# Patient Record
Sex: MALE | Race: WHITE | NOT HISPANIC OR LATINO | Employment: FULL TIME | URBAN - METROPOLITAN AREA
[De-identification: names, ages, dates, MRNs, and addresses within clinical notes are randomized per-mention and may not be internally consistent; named-entity substitution may affect disease eponyms.]

---
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
## 2022-11-10 ENCOUNTER — OFFICE VISIT (OUTPATIENT)
Dept: URGENT CARE | Facility: URGENT CARE | Age: 22
End: 2022-11-10
Payer: COMMERCIAL

## 2022-11-10 ENCOUNTER — ANCILLARY PROCEDURE (OUTPATIENT)
Dept: GENERAL RADIOLOGY | Facility: CLINIC | Age: 22
End: 2022-11-10
Attending: FAMILY MEDICINE
Payer: COMMERCIAL

## 2022-11-10 VITALS
DIASTOLIC BLOOD PRESSURE: 84 MMHG | TEMPERATURE: 98.2 F | RESPIRATION RATE: 18 BRPM | BODY MASS INDEX: 25.77 KG/M2 | WEIGHT: 198 LBS | OXYGEN SATURATION: 99 % | SYSTOLIC BLOOD PRESSURE: 124 MMHG | HEART RATE: 66 BPM

## 2022-11-10 DIAGNOSIS — M79.672 LEFT FOOT PAIN: Primary | ICD-10-CM

## 2022-11-10 PROCEDURE — 73630 X-RAY EXAM OF FOOT: CPT | Mod: TC | Performed by: STUDENT IN AN ORGANIZED HEALTH CARE EDUCATION/TRAINING PROGRAM

## 2022-11-10 PROCEDURE — 99203 OFFICE O/P NEW LOW 30 MIN: CPT | Performed by: FAMILY MEDICINE

## 2022-11-10 RX ORDER — NAPROXEN 500 MG/1
500 TABLET ORAL 2 TIMES DAILY WITH MEALS
Qty: 14 TABLET | Refills: 0 | Status: SHIPPED | OUTPATIENT
Start: 2022-11-10 | End: 2022-11-17

## 2022-11-10 NOTE — PROGRESS NOTES
SUBJECTIVE:  Chief Complaint   Patient presents with     Urgent Care     Left foot pain    .ident who presents with a chief complaint of  left foot pain.  Symptoms began 2 week(s) ago  Context:Injury: no    No past medical history on file.    No past surgical history on file.    No family history on file.    Social History     Tobacco Use     Smoking status: Never     Passive exposure: Yes     Smokeless tobacco: Never   Substance Use Topics     Alcohol use: No     ROS:Review of systems negative except as stated below    EXAM: /84   Pulse 66   Temp 98.2  F (36.8  C)   Resp 18   Wt 89.8 kg (198 lb)   SpO2 99%   BMI 25.77 kg/m    Exam:foot  tenderness to palpation  GENERAL APPEARANCE: healthy, alert and no distress  EXTREMITIES: peripheral pulses normal  SKIN: no suspicious lesions or rashes  NEURO: Normal strength and tone, sensory exam grossly normal, mentation intact and speech normal    Xray without acute findings, no fx read by Santos Chapa D.O.    ASSESSMENT:     ICD-10-CM    1. Left foot pain  M79.672 XR Foot Left G/E 3 Views     Orthopedic  Referral     naproxen (NAPROSYN) 500 MG tablet     Ankle/Foot Bracing Supplies Order for DME - ONLY FOR DME        [facility]

## 2022-11-15 ENCOUNTER — ANCILLARY PROCEDURE (OUTPATIENT)
Dept: GENERAL RADIOLOGY | Facility: CLINIC | Age: 22
End: 2022-11-15
Attending: PODIATRIST
Payer: COMMERCIAL

## 2022-11-15 ENCOUNTER — OFFICE VISIT (OUTPATIENT)
Dept: PODIATRY | Facility: CLINIC | Age: 22
End: 2022-11-15
Attending: FAMILY MEDICINE
Payer: COMMERCIAL

## 2022-11-15 VITALS
WEIGHT: 198 LBS | HEIGHT: 74 IN | SYSTOLIC BLOOD PRESSURE: 120 MMHG | DIASTOLIC BLOOD PRESSURE: 74 MMHG | BODY MASS INDEX: 25.41 KG/M2

## 2022-11-15 DIAGNOSIS — M79.672 LEFT FOOT PAIN: ICD-10-CM

## 2022-11-15 DIAGNOSIS — M25.375 SUBTALAR JOINT INSTABILITY, LEFT: Primary | ICD-10-CM

## 2022-11-15 DIAGNOSIS — M76.72 PERONEAL TENDONITIS, LEFT: ICD-10-CM

## 2022-11-15 DIAGNOSIS — M21.6X2 ACQUIRED PES VALGUS, LEFT: ICD-10-CM

## 2022-11-15 PROCEDURE — 73600 X-RAY EXAM OF ANKLE: CPT | Mod: TC | Performed by: RADIOLOGY

## 2022-11-15 PROCEDURE — 73630 X-RAY EXAM OF FOOT: CPT | Mod: TC | Performed by: RADIOLOGY

## 2022-11-15 PROCEDURE — 99204 OFFICE O/P NEW MOD 45 MIN: CPT | Performed by: PODIATRIST

## 2022-11-15 NOTE — LETTER
11/15/2022         RE: Brayden Best  [address]  [facility] [address]        Dear Colleague,    Thank you for referring your patient, Brayden Best, to the [facility]. Please see a copy of my visit note below.    Assessment:      ICD-10-CM    1. Subtalar joint instability, left  M25.375 diclofenac (VOLTAREN) 50 MG EC tablet      2. Peroneal tendonitis, left  M76.72 Orthopedic  Referral     XR Ankle Left 2 Views     XR Foot Left G/E 3 Views     diclofenac (VOLTAREN) 50 MG EC tablet      3. Acquired pes valgus, left  M21.6X2            Plan:  Orders Placed This Encounter   Procedures     XR Ankle Left 2 Views     XR Foot Left G/E 3 Views       Discussed the etiology and treatment of the condition with the patient.  Imaging studies reviewed and discussed with the patient.  Discussed surgical and conservative options.    STJ acute flare, peroneal tendon pain / intra sheath subluxation  Nadine ongoing lateral instability      -Injection- to STJ discussed if needed  -NSAID- Rx po  -Immobilization- boot- has at home    -Orthoses- SuperFeet  -Brace- ASO  -PT- offered, pt declines  -Activity- avoid uneven ground  -WB- full    Can MRI if not improved      Return:  No follow-ups on file.    Marley Londono DPM                Chief Complaint:     Patient presents with:  Foot Problems     Left ankle pain    HPI:  Brayden Best is a 22 year old year old male who presents for evaluation of ankle pain.    Pain location- lateral ankle / RF  Point to arc underneath / in front on fibula    Works for [employer], on his feet a lot   walking dog several miles walking also.  Admits to having sprained both ankles previously    Went to , was placed in a boot & kept off work x1 week    States pain he had initially went away, now some clicking on outside of ankle that remains    Past Medical & Surgical History:  No past medical history on file.   No past surgical history on file.   No  family history on file.     Social History:  ?  History   Smoking Status     Never   Smokeless Tobacco     Never     History   Drug Use     Types: Marijuana     Social History    Substance and Sexual Activity      Alcohol use: No      Allergies:  ?   Allergies   Allergen Reactions     No Known Allergies         Medications:    Current Outpatient Medications   Medication     diclofenac (VOLTAREN) 50 MG EC tablet     cetirizine (ZYRTEC) 10 MG tablet     Lactobacillus-Inulin ([facility] DIGESTIVE [facility]) CAPS     naproxen (NAPROSYN) 500 MG tablet     ranitidine (ZANTAC) 300 MG tablet     No current facility-administered medications for this visit.       Physical Exam:  ?  Vitals:  There were no vitals taken for this visit.   General:  WD/WN, in NAD.  A&O x3.  Dermatologic:    Skin is intact, open lesions absent.   Skin texture, turgor is normal.  Vascular:  Pulses palpable bilateral.  Digital capillary refill time normal bilateral.  Skin temperature is normal bilateral.  Generalized edema- none.  Focal edema- trace sinus tarsi / lateral ankle L  left.  Neurologic:    Gross sensation normal.  Gait and balance normal.  Musculoskeletal:  Maximal pain to palpation of sinus tarsi left.  mild pain to palpation of lateral ankle gutter, left.  Ankle ROM smooth, nonpainful bilateral.  STJ, MTJ ROM full, pain free left.  Ankle anterior drawer mildly increased l vs R, talar tilt increased b/l.  Manual Muscle Testing of peroneals  pain free 5/5  left.  Ankle circumduction does not produce retrofibular subluxation but produces palpable peroneal clicking    Stance:  RCSP Valgus bilateral.    Imaging:   x-ray independently reviewed and interpreted by myself today.  Weight-bearing views left foot & ankle dated 11/15/22, reveal moderate transverse plane flatfoot, no tarsal coalition or STJ degeneration.  Ankle intact without tilt or degeneration & intact subchondral bone.                    Again, thank you for allowing me to  participate in the care of your patient.        Sincerely,        Marley Londono DPM

## 2022-11-15 NOTE — PROGRESS NOTES
Assessment:      ICD-10-CM    1. Subtalar joint instability, left  M25.375 diclofenac (VOLTAREN) 50 MG EC tablet      2. Peroneal tendonitis, left  M76.72 Orthopedic  Referral     XR Ankle Left 2 Views     XR Foot Left G/E 3 Views     diclofenac (VOLTAREN) 50 MG EC tablet      3. Acquired pes valgus, left  M21.6X2            Plan:  Orders Placed This Encounter   Procedures     XR Ankle Left 2 Views     XR Foot Left G/E 3 Views       Discussed the etiology and treatment of the condition with the patient.  Imaging studies reviewed and discussed with the patient.  Discussed surgical and conservative options.    STJ acute flare, peroneal tendon pain / intra sheath subluxation  Nadine ongoing lateral instability      -Injection- to STJ discussed if needed  -NSAID- Rx po  -Immobilization- boot- has at home    -Orthoses- SuperFeet  -Brace- ASO  -PT- offered, pt declines  -Activity- avoid uneven ground  -WB- full    Can MRI if not improved      Return:  No follow-ups on file.    Marley Londono DPM                Chief Complaint:     Patient presents with:  Foot Problems     Left ankle pain    HPI:  Brayden Best is a 22 year old year old male who presents for evaluation of ankle pain.    Pain location- lateral ankle / RF  Point to arc underneath / in front on [employer]    Works for [employer], on his feet a lot   walking dog several miles walking also.  Admits to having sprained both ankles previously    Went to , was placed in a boot & kept off work x1 week    States pain he had initially went away, now some clicking on outside of ankle that remains    Past Medical & Surgical History:  No past medical history on file.   No past surgical history on file.   No family history on file.     Social History:  ?  History   Smoking Status     Never   Smokeless Tobacco     Never     History   Drug Use     Types: Marijuana     Social History    Substance and Sexual Activity      Alcohol use: No      Allergies:  ?    Allergies   Allergen Reactions     No Known Allergies         Medications:    Current Outpatient Medications   Medication     diclofenac (VOLTAREN) 50 MG EC tablet     cetirizine (ZYRTEC) 10 MG tablet     Lactobacillus-Inulin ([facility] DIGESTIVE [facility]) CAPS     naproxen (NAPROSYN) 500 MG tablet     ranitidine (ZANTAC) 300 MG tablet     No current facility-administered medications for this visit.       Physical Exam:  ?  Vitals:  There were no vitals taken for this visit.   General:  WD/WN, in NAD.  A&O x3.  Dermatologic:    Skin is intact, open lesions absent.   Skin texture, turgor is normal.  Vascular:  Pulses palpable bilateral.  Digital capillary refill time normal bilateral.  Skin temperature is normal bilateral.  Generalized edema- none.  Focal edema- trace sinus tarsi / lateral ankle L  left.  Neurologic:    Gross sensation normal.  Gait and balance normal.  Musculoskeletal:  Maximal pain to palpation of sinus tarsi left.  mild pain to palpation of lateral ankle gutter, left.  Ankle ROM smooth, nonpainful bilateral.  STJ, MTJ ROM full, pain free left.  Ankle anterior drawer mildly increased l vs R, talar tilt increased b/l.  Manual Muscle Testing of peroneals  pain free 5/5  left.  Ankle circumduction does not produce retrofibular subluxation but produces palpable peroneal clicking    Stance:  RCSP Valgus bilateral.    Imaging:   x-ray independently reviewed and interpreted by myself today.  Weight-bearing views left foot & ankle dated 11/15/22, reveal moderate transverse plane flatfoot, no tarsal coalition or STJ degeneration.  Ankle intact without tilt or degeneration & intact subchondral bone.

## 2022-11-15 NOTE — PATIENT INSTRUCTIONS
PATIENT INSTRUCTIONS - Podiatry / Foot & Ankle Surgery      Return to work tomorrow - full duty      Size 12 shoe  ASO ankle brace  Cinch into eversion to unload peroneal tendons & subtalar joint    Diclofenac / Voltaren Gel- Apply to affected area only.  Apply 3-4 times daily for the first 3-4 days, then 1-2 times daily as needed.  Over the counter (OTC), a prescription is not needed.  Available at most pharmacies, [employer], [employer].      Diclofenac / Voltaren - 1 pill twice daily x1 week.  Then take a 1 week break.  Repeat as needed.  Take with food & an acid blocker if stomach upset occurs.  Stop all other NSAIDs (aspirin, ibuprofen/Motrin, naproxen/ Aleve).        SuperFeet orthotics  SuperFeet are over the counter (OTC), you do not need a prescription.  Wear Superfeet orthotics in all of your shoes.  Remove the existing liner and replace it with SuperFeet.    SuperFeet are available on [employer], at [employer] and most specialty [employer].      We can perform subtalar joint cortisone injection if the joint flares, PT referral, or MRI

## 2022-11-15 NOTE — LETTER
November 15, 2022      RE: Brayden Best  [address] [facility] [address]       To whom it may concern:    Brayden Best was seen in our clinic today. He may return to work with no restrictions on 11/16/2022.        Sincerely,    Marley Londono DPM  (electronically signed)

## 2022-11-27 ENCOUNTER — OFFICE VISIT (OUTPATIENT)
Dept: URGENT CARE | Facility: URGENT CARE | Age: 22
End: 2022-11-27
Payer: COMMERCIAL

## 2022-11-27 VITALS
RESPIRATION RATE: 17 BRPM | TEMPERATURE: 98.1 F | HEART RATE: 94 BPM | OXYGEN SATURATION: 98 % | SYSTOLIC BLOOD PRESSURE: 119 MMHG | BODY MASS INDEX: 24.73 KG/M2 | DIASTOLIC BLOOD PRESSURE: 76 MMHG | WEIGHT: 190 LBS

## 2022-11-27 DIAGNOSIS — J10.1 INFLUENZA A: Primary | ICD-10-CM

## 2022-11-27 LAB
DEPRECATED S PYO AG THROAT QL EIA: NEGATIVE
FLUAV AG SPEC QL IA: POSITIVE
FLUBV AG SPEC QL IA: NEGATIVE
GROUP A STREP BY PCR: NOT DETECTED
SARS-COV-2 RNA RESP QL NAA+PROBE: NEGATIVE

## 2022-11-27 PROCEDURE — 87804 INFLUENZA ASSAY W/OPTIC: CPT

## 2022-11-27 PROCEDURE — 87651 STREP A DNA AMP PROBE: CPT | Performed by: PHYSICIAN ASSISTANT

## 2022-11-27 PROCEDURE — U0005 INFEC AGEN DETEC AMPLI PROBE: HCPCS | Performed by: PHYSICIAN ASSISTANT

## 2022-11-27 PROCEDURE — U0003 INFECTIOUS AGENT DETECTION BY NUCLEIC ACID (DNA OR RNA); SEVERE ACUTE RESPIRATORY SYNDROME CORONAVIRUS 2 (SARS-COV-2) (CORONAVIRUS DISEASE [COVID-19]), AMPLIFIED PROBE TECHNIQUE, MAKING USE OF HIGH THROUGHPUT TECHNOLOGIES AS DESCRIBED BY CMS-2020-01-R: HCPCS | Performed by: PHYSICIAN ASSISTANT

## 2022-11-27 PROCEDURE — 99213 OFFICE O/P EST LOW 20 MIN: CPT | Mod: CS | Performed by: PHYSICIAN ASSISTANT

## 2022-11-27 NOTE — PATIENT INSTRUCTIONS
Patient was educated on the natural course of viral illness which typically lasts up to 10 days.  Conservative measures discussed including increased fluids, warm steamy shower, salt water gargles, cough suppressants, expectorants (Mucinex), and analgesics (Tylenol and/or Ibuprofen). See your primary care provider if symptoms worsen or do not improve in 5 days. Seek emergency care if you develop fever over 104 or shortness of breath.

## 2022-11-27 NOTE — PROGRESS NOTES
URGENT CARE VISIT:    SUBJECTIVE:   Brayden Best is a 22 year old male presenting with a chief complaint of fever, chills, stuffy nose, cough - productive and body aches.  Onset was 4 day(s) ago.   He denies the following symptoms: vomiting and diarrhea  Course of illness is same.    Treatment measures tried include OTC cold med with no relief of symptoms.  Predisposing factors include sick family members.    PMH: History reviewed. No pertinent past medical history.  Allergies: No known allergies   Medications:   Current Outpatient Medications   Medication Sig Dispense Refill     cetirizine (ZYRTEC) 10 MG tablet Take 1 tablet (10 mg) by mouth daily (Patient not taking: Reported on 11/27/2022) 30 tablet 1     diclofenac (VOLTAREN) 50 MG EC tablet Take 1 tablet (50 mg) by mouth 2 times daily for 7 days 28 tablet 1     Lactobacillus-Inulin ([facility] DIGESTIVE [facility]) CAPS Take 1 capsule by mouth daily (Patient not taking: Reported on 11/27/2022) 30 capsule 3     ranitidine (ZANTAC) 300 MG tablet Take 0.5 tablets (150 mg) by mouth At Bedtime (Patient not taking: Reported on 11/27/2022) 60 tablet 0     Social History:   Social History     Tobacco Use     Smoking status: Never     Passive exposure: Yes     Smokeless tobacco: Never   Substance Use Topics     Alcohol use: No       ROS:  Review of systems negative except as stated above.    OBJECTIVE:  /76 (BP Location: Left arm, Patient Position: Sitting, Cuff Size: Adult Large)   Pulse 94   Temp 98.1  F (36.7  C) (Tympanic)   Resp 17   Wt 86.2 kg (190 lb)   SpO2 98%   BMI 24.73 kg/m    GENERAL APPEARANCE: healthy, alert and no distress  EYES: EOMI,  PERRL, conjunctiva clear  HENT: ear canals and TM's normal.  Nose and mouth without ulcers, erythema or lesions  NECK: supple, nontender, no lymphadenopathy  RESP: lungs clear to auscultation - no rales, rhonchi or wheezes  CV: regular rates and rhythm, normal S1 S2, no murmur noted  SKIN: no suspicious lesions  or rashes    Labs:    Results for orders placed or performed in visit on 11/27/22   Influenza A & B Antigen - Clinic Collect     Status: Abnormal    Specimen: Nasopharyngeal; Swab   Result Value Ref Range    Influenza A antigen Positive (A) Negative    Influenza B antigen Negative Negative    Narrative    Test results must be correlated with clinical data. If necessary, results should be confirmed by a molecular assay or viral culture.   Streptococcus A Rapid Screen w/Reflex to PCR - Clinic Collect     Status: Normal    Specimen: Throat; Swab   Result Value Ref Range    Group A Strep antigen Negative Negative       ASSESSMENT:    ICD-10-CM    1. Influenza A  J10.1 Influenza A & B Antigen - Clinic Collect     Symptomatic; Unknown COVID-19 Virus (Coronavirus) by PCR Nose     Streptococcus A Rapid Screen w/Reflex to PCR - Clinic Collect     Group A Streptococcus PCR Throat Swab          PLAN:  Patient Instructions   Patient was educated on the natural course of viral illness which typically lasts up to 10 days.  Conservative measures discussed including increased fluids, warm steamy shower, salt water gargles, cough suppressants, expectorants (Mucinex), and analgesics (Tylenol and/or Ibuprofen). See your primary care provider if symptoms worsen or do not improve in 5 days. Seek emergency care if you develop fever over 104 or shortness of breath.  Patient verbalized understanding and is agreeable to plan. The patient was discharged ambulatory and in stable condition.    July Patel PA-C ....................  11/27/2022   1:06 PM

## 2022-11-27 NOTE — LETTER
[facility] URGENT CARE [place]  [address] [address]  [phone]      November 27, 2022    RE:  Brayden Best                                                                                                                                                       [address] [facility] [address]            To whom it may concern:    Brayden Best was seen in clinic for influenza. Please excuse 11/24/22 through 11/29/22.      Sincerely,        July Patel PA-C    [facility]

## 2022-11-27 NOTE — LETTER
[facility] [place]  [address] [address]  [phone]      November 27, 2022    RE:  Brayden Best                                                                                                                                                       [address] [facility] [address]            To whom it may concern:    Brayden Best was seen in clinic today. Please excuse him from work tomorrow.        Sincerely,        JOELLEN Moralez [facility] Urgent [facility]

## 2024-05-08 ENCOUNTER — OFFICE VISIT (OUTPATIENT)
Dept: URGENT CARE | Facility: URGENT CARE | Age: 24
End: 2024-05-08
Payer: COMMERCIAL

## 2024-05-08 VITALS
OXYGEN SATURATION: 97 % | DIASTOLIC BLOOD PRESSURE: 72 MMHG | HEART RATE: 84 BPM | RESPIRATION RATE: 16 BRPM | TEMPERATURE: 98.2 F | SYSTOLIC BLOOD PRESSURE: 115 MMHG

## 2024-05-08 DIAGNOSIS — J06.9 VIRAL URI WITH COUGH: Primary | ICD-10-CM

## 2024-05-08 DIAGNOSIS — R09.81 NASAL CONGESTION: ICD-10-CM

## 2024-05-08 PROCEDURE — 99213 OFFICE O/P EST LOW 20 MIN: CPT | Performed by: PHYSICIAN ASSISTANT

## 2024-05-08 RX ORDER — BENZONATATE 200 MG/1
200 CAPSULE ORAL 3 TIMES DAILY PRN
Qty: 30 CAPSULE | Refills: 0 | Status: SHIPPED | OUTPATIENT
Start: 2024-05-08

## 2024-05-08 RX ORDER — PSEUDOEPHEDRINE HCL 120 MG/1
120 TABLET, FILM COATED, EXTENDED RELEASE ORAL EVERY 12 HOURS
Qty: 10 TABLET | Refills: 0 | Status: SHIPPED | OUTPATIENT
Start: 2024-05-08 | End: 2024-05-13

## 2024-05-08 NOTE — LETTER
May 8, 2024      Brayden Best  [address] [facility] [address]        To Whom It May Concern:    Brayden Best  was seen on 5/8/2024  Please excuse his absence from work 5/6 through 5/9 due to acute medical illness.        Sincerely,        Viridiana Sadler PA-C

## 2024-05-08 NOTE — PROGRESS NOTES
Assessment & Plan     Viral URI with cough  Acute problem.  On exam patient is in no acute respiratory distress.  Lungs are clear.  Vitals are stable.  Patient is nontoxic-appearing.  Tessalon perles Rx prn cough.  Patient educational information provided regarding course of symptoms.  Advised to keep monitoring symptoms.  Follow-up if any worsening symptoms.  Patient agrees with the plan.    - benzonatate (TESSALON) 200 MG capsule  Dispense: 30 capsule; Refill: 0    Nasal congestion  Sudafed is prescribed to help with nasal congestion.  Advised to keep monitoring symptoms.  Follow-up if any worsening symptoms.  Patient agrees.  - pseudoePHEDrine (SUDAFED) 120 MG 12 hr tablet  Dispense: 10 tablet; Refill: 0       Return in about 1 week (around 5/15/2024) for Symptoms failing to improve.    Viridiana Sadler PA-C  [facility] [place] is a 24 year old male who presents to clinic today for the following health issues:  Chief Complaint   Patient presents with    Nasal Congestion     Sick x 3-4 days, some SOB (unable to take deep breath when taking deep breath), neg home covid test, cold getting worse, no fever, some fatigue and body aches (feeling exhausted)     HPI      URI     Onset of symptoms was 3 day(s) ago.  Course of illness is worsening.    Severity moderate  Current and Associated symptoms: productive cough, nasal congestion, shortness of breath/chest tightness, fatigue  No fever, No v/d  Treatment measures tried include None tried.  Predisposing factors include None.  Patient reports negative Home covid test  Patient missed work, he needs a work excuse note.      Review of Systems  Constitutional, HEENT, cardiovascular, pulmonary, GI, , musculoskeletal, neuro, skin, endocrine and psych systems are negative, except as otherwise noted.      Objective    /72   Pulse 84   Temp 98.2  F (36.8  C) (Oral)   Resp 16   SpO2 97%   Physical Exam   GENERAL: alert and  no distress  HENT: ear canals and TM's normal, nose with boggy turbinates, mouth without ulcers or lesions, throat is moist and pink, uvula is midline  RESP: lungs clear to auscultation - no rales, rhonchi or wheezes  CV: regular rate and rhythm, normal S1 S2  MS: no gross musculoskeletal defects noted, no edema  SKIN: no suspicious lesions or rashes

## 2024-06-13 ENCOUNTER — OFFICE VISIT (OUTPATIENT)
Dept: URGENT CARE | Facility: URGENT CARE | Age: 24
End: 2024-06-13
Payer: COMMERCIAL

## 2024-06-13 VITALS — TEMPERATURE: 98.6 F

## 2024-06-13 DIAGNOSIS — R55 VASOVAGAL NEAR SYNCOPE: Primary | ICD-10-CM

## 2024-06-13 PROCEDURE — 99214 OFFICE O/P EST MOD 30 MIN: CPT | Performed by: FAMILY MEDICINE

## 2024-06-13 NOTE — PROGRESS NOTES
ICD-10-CM    1. Vasovagal near syncope  R55         Normal exam today.  No concerning findings on orthostatics.  Likely situational and not related to serious underlying pathology.  I do not think an EKG is warranted at this time, but did discuss with patient that he might need more cardiac workup in the future if such episodes become more frequent.    PLAN:  Printing information about vasovagal syncope provided to patient.  Reviewed signs/symptoms that should prompt immediate follow up for recheck.  Cleared to return to work without restrictions tomorrow.  Did encourage pt to eat at least a small breakfast before work and to keep knees unlocked during their stand-up meetings before shift.    SUBJECTIVE:  Brayden Best is a 24 year old male who presents to  today after feeling like he was about to pass out at work this morning.  Was standing in a meeting and started to feel pretty hot, then vision started to tunnel in and he felt faint.  He did not lose consciousness.  He did not have any chest pain or palpitations.  No shortness of breath.  Five hours passed between episode and visit, and he's feeling back to normal now.  He did not eat breakfast this morning, but that's pretty typical for him.    Mom has a history of fainting relatively easily per pt.    OBJECTIVE:  Temp 98.6  F (37  C) (Tympanic)   GEN: well-appearing, in NAD  EYES: PERRL, EOMI, anicteric sclerae, normal conjunctivae  ENT: TMs normal, oral MMM, normal pharynx  Neck: no cervical LAD  Lungs:  CTAB  CV:  RRR, no murmurs, normal S1 and S2  Neuro: normal strength throughout extremities, no pronator drift, no facial droop, negative Romberg, normal speech, normal gait, normal coordination

## 2024-06-13 NOTE — LETTER
June 13, 2024    Brayden Best  [address] [facility] [address]    To Whom It May Concern:    Brayden Best was seen on June 13, 2024.  Please excuse him from work today.  He is cleared to return without restrictions on June 14, 2024.    Sincerely,        Ekaterina Mott MD

## 2024-07-25 ENCOUNTER — OFFICE VISIT (OUTPATIENT)
Dept: URGENT CARE | Facility: URGENT CARE | Age: 24
End: 2024-07-25
Payer: COMMERCIAL

## 2024-07-25 VITALS
SYSTOLIC BLOOD PRESSURE: 118 MMHG | OXYGEN SATURATION: 97 % | BODY MASS INDEX: 25.77 KG/M2 | DIASTOLIC BLOOD PRESSURE: 70 MMHG | TEMPERATURE: 98.1 F | HEART RATE: 92 BPM | WEIGHT: 198 LBS

## 2024-07-25 DIAGNOSIS — K52.9 GASTROENTERITIS: Primary | ICD-10-CM

## 2024-07-25 PROCEDURE — 99213 OFFICE O/P EST LOW 20 MIN: CPT | Performed by: FAMILY MEDICINE

## 2024-07-25 NOTE — LETTER
July 25, 2024      Brayden Best  [address] [facility] [address]        To Whom It May Concern:    Brayden Best was seen in our clinic. He may return to work without restrictions on 7/26/2024      Sincerely,        Aleena Mata MD

## 2024-07-25 NOTE — PROGRESS NOTES
ASSESSMENT/  PLAN:  Gastroenteritis     Diet: small amounts clear fluids frequently,soups,juices,water,advance diet as tolerated  Rest as much as possible.     Advise careful hand washing to reduce the risk of passing the illness to others in close contact    Note for work    --------------------------------------------------------------------------------------------------------        SUBJECTIVE:  Chief Complaint   Patient presents with    Abdominal Pain     stomach issues-- diarrhea x this AM    Letter Out     Letter out for work     Brayden Best is a 24 year old male whose symptoms began 1 day ago and include diarrhea,  3 x this morning with cramping, no vomiting,  now resolved.   Patient denies fever, chills, URI symptoms, and cough  Symptoms are sudden onset and resolved and moderate.    He called in to work,  and needs a note for return to work    Associated symptoms:  Pain:  Mild diffuse, generalized crampy abdominal pain this am,  now resolved  Fever: no noted fevers  Diarrhea:  consists of 3 stools/day  This morning and is resolved  Stools: runny and loose  Appetite: now back to normal  Vomitin times    Risk factors: none  Patient denies sick contacts, possible bad food exposure, travel , recent antibiotic use, recent hospitalization, and recent medication changes    No past medical history on file.  There is no problem list on file for this patient.      ALLERGIES:  No known allergies    Current Outpatient Medications   Medication Sig Dispense Refill    benzonatate (TESSALON) 200 MG capsule Take 1 capsule (200 mg) by mouth 3 times daily as needed for cough (Patient not taking: Reported on 2024) 30 capsule 0    cetirizine (ZYRTEC) 10 MG tablet Take 1 tablet (10 mg) by mouth daily (Patient not taking: Reported on 2022) 30 tablet 1    diclofenac (VOLTAREN) 50 MG EC tablet Take 1 tablet (50 mg) by mouth 2 times daily for 7 days 28 tablet 1    Lactobacillus-Inulin (CULTURELLE DIGESTIVE  HEALTH) CAPS Take 1 capsule by mouth daily (Patient not taking: Reported on 11/27/2022) 30 capsule 3    ranitidine (ZANTAC) 300 MG tablet Take 0.5 tablets (150 mg) by mouth At Bedtime (Patient not taking: Reported on 11/27/2022) 60 tablet 0     No current facility-administered medications for this visit.       Social History     Tobacco Use    Smoking status: Never     Passive exposure: Yes    Smokeless tobacco: Never   Substance Use Topics    Alcohol use: No       No family history on file.      ROS:  CONSTITUTIONAL:NEGATIVE for fever, chills,    INTEGUMENTARY/SKIN: NEGATIVE for worrisome rashes, or lesions  EYES: NEGATIVE for vision changes or irritation  ENT/MOUTH: NEGATIVE for ear, mouth and throat problems    OBJECTIVE:  /70 (BP Location: Right arm, Patient Position: Chair, Cuff Size: Adult Regular)   Pulse 92   Temp 98.1  F (36.7  C) (Oral)   Wt 89.8 kg (198 lb)   SpO2 97%   BMI 25.77 kg/m      GENERAL APPEARANCE:  alert and no distress  EYES: EOMI,  PERRL, conjunctiva clear  HENT: ear canals and TM's normal.  Nose and mouth without ulcers, erythema or lesions  NECK: supple, nontender, no lymphadenopathy  RESP: lungs clear to auscultation - no rales, rhonchi or wheezes  CV: regular rates and rhythm, normal S1 S2, no murmur noted  ABDOMEN:  soft,no tenderness, no HSM or masses and bowel sounds normal  Extremities:  Motor, sensation intact,  Normal ROM  NEURO: Normal strength and tone, sensory exam grossly normal,  normal speech and mentation  SKIN: no suspicious lesions or rashes

## 2025-01-16 ENCOUNTER — OFFICE VISIT (OUTPATIENT)
Dept: URGENT CARE | Facility: URGENT CARE | Age: 25
End: 2025-01-16
Payer: COMMERCIAL

## 2025-01-16 VITALS
DIASTOLIC BLOOD PRESSURE: 70 MMHG | RESPIRATION RATE: 20 BRPM | BODY MASS INDEX: 25.77 KG/M2 | OXYGEN SATURATION: 100 % | HEART RATE: 80 BPM | SYSTOLIC BLOOD PRESSURE: 118 MMHG | TEMPERATURE: 98.1 F | WEIGHT: 198 LBS

## 2025-01-16 DIAGNOSIS — R11.2 NAUSEA AND VOMITING, UNSPECIFIED VOMITING TYPE: Primary | ICD-10-CM

## 2025-01-16 RX ORDER — ONDANSETRON 8 MG/1
8 TABLET, FILM COATED ORAL EVERY 8 HOURS PRN
Qty: 12 TABLET | Refills: 0 | Status: CANCELLED | OUTPATIENT
Start: 2025-01-16 | End: 2025-01-20

## 2025-01-16 NOTE — Clinical Note
January 16, 2025      Brayden Best  [address] [facility] [address]        To Whom It May Concern:    Brayden Best  was seen on ***.  Please excuse him  until *** due to {WORK EXCUSE:622527}.        Sincerely,        JUNIOR Mcneill CNP    Electronically signed

## 2025-01-16 NOTE — PROGRESS NOTES
Assessment & Plan     1. Nausea and vomiting, unspecified vomiting type (Primary)  Symptoms improving will monitor   Recommend pushing fluids electrolyte replacement beverage.  Letter given for work missed today.      [facility] is a 24 year [facility] male who presents to clinic today for the following health issues:  Chief Complaint   Patient presents with    Urgent Care     Started this morning-vomiting about 3 times this morning- mild headache- no appetite       HPI      Gastro    Onset of symptoms was this am   Course of illness is improving.    Severity mild  Current and Associated symptoms:  none  Aggravating factors: nothing.    Alleviating factors:nothing  Diarrhea: No  Stools: normal  Vomitting: Yes  3 times/day and is resolved  Appetite: decreased  Risk factors: none      Review of Systems  Constitutional, HEENT, cardiovascular, pulmonary, gi and gu systems are negative, except as otherwise noted.      Objective    /70   Pulse 80   Temp 98.1  F (36.7  C)   Resp 20   Wt 89.8 kg (198 lb)   SpO2 100%   BMI 25.77 kg/m    Physical Exam   GENERAL: alert and no distress  EYES: Eyes grossly normal to inspection, PERRL and conjunctivae and sclerae normal  HENT: ear canals and TM's normal, nose and mouth without ulcers or lesions  NECK: no adenopathy, no asymmetry, masses, or scars  RESP: lungs clear to auscultation - no rales, rhonchi or wheezes  CV: regular rate and rhythm, normal S1 S2, no S3 or S4, no murmur, click or rub, no peripheral edema  ABDOMEN: soft, nontender, no hepatosplenomegaly, no masses and bowel sounds normal  MS: no gross musculoskeletal defects noted, no edema

## 2025-06-23 ENCOUNTER — OFFICE VISIT (OUTPATIENT)
Dept: URGENT CARE | Facility: URGENT CARE | Age: 25
End: 2025-06-23
Payer: COMMERCIAL

## 2025-06-23 VITALS
TEMPERATURE: 98.2 F | SYSTOLIC BLOOD PRESSURE: 101 MMHG | RESPIRATION RATE: 20 BRPM | BODY MASS INDEX: 23.74 KG/M2 | HEART RATE: 83 BPM | HEIGHT: 74 IN | DIASTOLIC BLOOD PRESSURE: 69 MMHG | WEIGHT: 185 LBS | OXYGEN SATURATION: 95 %

## 2025-06-23 DIAGNOSIS — J02.9 SORE THROAT: Primary | ICD-10-CM

## 2025-06-23 DIAGNOSIS — R05.1 ACUTE COUGH: ICD-10-CM

## 2025-06-23 LAB
DEPRECATED S PYO AG THROAT QL EIA: NEGATIVE
S PYO DNA THROAT QL NAA+PROBE: NOT DETECTED

## 2025-06-23 PROCEDURE — 87651 STREP A DNA AMP PROBE: CPT | Performed by: NURSE PRACTITIONER

## 2025-06-23 PROCEDURE — 3074F SYST BP LT 130 MM HG: CPT | Performed by: NURSE PRACTITIONER

## 2025-06-23 PROCEDURE — 87635 SARS-COV-2 COVID-19 AMP PRB: CPT | Performed by: NURSE PRACTITIONER

## 2025-06-23 PROCEDURE — 3078F DIAST BP <80 MM HG: CPT | Performed by: NURSE PRACTITIONER

## 2025-06-23 PROCEDURE — 99214 OFFICE O/P EST MOD 30 MIN: CPT | Performed by: NURSE PRACTITIONER

## 2025-06-23 RX ORDER — BENZONATATE 200 MG/1
200 CAPSULE ORAL 3 TIMES DAILY PRN
Qty: 30 CAPSULE | Refills: 0 | Status: SHIPPED | OUTPATIENT
Start: 2025-06-23

## 2025-06-23 NOTE — PATIENT INSTRUCTIONS
Results for orders placed or performed in visit on 06/23/25   Streptococcus A Rapid Screen w/Reflex to PCR - Clinic Collect     Status: Normal    Specimen: Throat; Swab   Result Value Ref Range    Group A Strep antigen Negative Negative     RST negative   covid  swab pending.    Push fluids  Lots of handwashing.   Ibuprofen as needed for fever or pain  tessalon or dayquil/nyquil for cough as needed     Rest as able.   Will call if any other labs positive.    F/u in the clinic if symptoms persist or worsen.

## 2025-06-23 NOTE — PROGRESS NOTES
Urgent Care Clinic Visit    Chief Complaint   Patient presents with    Urgent Care     Cold sweats, Chest congestion, coughing fits, x Saturday.                6/23/2025    10:39 AM   Additional Questions   Roomed by Gisele burton   Accompanied by self     Assessment & Plan     Sore throat  - Streptococcus A Rapid Screen w/Reflex to PCR - Clinic Collect  - COVID-19 Virus (Coronavirus) by PCR Nose  - Group A Streptococcus PCR Throat Swab    Acute cough  - Streptococcus A Rapid Screen w/Reflex to PCR - Clinic Collect  - COVID-19 Virus (Coronavirus) by PCR Nose  - Group A Streptococcus PCR Throat Swab  - benzonatate (TESSALON) 200 MG capsule  Dispense: 30 capsule; Refill: 0     Patient Instructions     Results for orders placed or performed in visit on 06/23/25   Streptococcus A Rapid Screen w/Reflex to PCR - Clinic Collect     Status: Normal    Specimen: Throat; Swab   Result Value Ref Range    Group A Strep antigen Negative Negative     RST negative   covid  swab pending.    Push fluids  Lots of handwashing.   Ibuprofen as needed for fever or pain  tessalon or dayquil/nyquil for cough as needed     Rest as able.   Will call if any other labs positive.    F/u in the clinic if symptoms persist or worsen.        No follow-ups on file.    [facility] 25 year [facility] male who presents to clinic today for the following health issues:  Chief Complaint   Patient presents with    Urgent Care     Cold sweats, Chest congestion, coughing fits, x Saturday.          6/23/2025    10:39 AM   Additional Questions   Roomed by Gisele burton   Accompanied by self     HPI    URI Adult    Onset of symptoms was 2 day(s) ago.  Course of illness is same.    Severity moderate  Current and Associated symptoms: chills, cough - non-productive, and sore throat  Treatment measures tried include Tylenol/Ibuprofen, OTC Cough med, and Fluids.  Predisposing factors include  "None.      Review of Systems  Constitutional, HEENT, cardiovascular, pulmonary, GI, , musculoskeletal, neuro, skin, endocrine and psych systems are negative, except as otherwise noted.      Objective    /69 (BP Location: Right arm, Patient Position: Sitting, Cuff Size: Adult Large)   Pulse 83   Temp 98.2  F (36.8  C) (Oral)   Resp 20   Ht 1.88 m (6' 2\")   Wt 83.9 kg (185 lb)   SpO2 95%   BMI 23.75 kg/m    Physical Exam   GENERAL: alert and no distress  EYES: Eyes grossly normal to inspection, PERRL and conjunctivae and sclerae normal  HENT: ear canals and TM's normal, nose and mouth without ulcers or lesions  NECK: no adenopathy, no asymmetry, masses, or scars  RESP: lungs clear to auscultation - no rales, rhonchi or wheezes  CV: regular rate and rhythm, normal S1 S2, no S3 or S4, no murmur, click or rub, no peripheral edema  ABDOMEN: soft, nontender, no hepatosplenomegaly, no masses and bowel sounds normal  MS: no gross musculoskeletal defects noted, no edema              "

## 2025-06-23 NOTE — LETTER
2025    Braydenyi Valverdeon   2000        To Whom it May Concern;    Please excuse Brayden A Estephania from work/school for a healthcare visit on 2025.    Sincerely,        JUNIOR Loyd CNP

## 2025-06-24 LAB — SARS-COV-2 RNA RESP QL NAA+PROBE: NEGATIVE

## 2025-09-02 ENCOUNTER — OFFICE VISIT (OUTPATIENT)
Dept: URGENT CARE | Facility: URGENT CARE | Age: 25
End: 2025-09-02

## 2025-09-02 VITALS
HEART RATE: 61 BPM | WEIGHT: 185 LBS | TEMPERATURE: 97.7 F | OXYGEN SATURATION: 99 % | DIASTOLIC BLOOD PRESSURE: 82 MMHG | HEIGHT: 74 IN | RESPIRATION RATE: 18 BRPM | BODY MASS INDEX: 23.74 KG/M2 | SYSTOLIC BLOOD PRESSURE: 142 MMHG

## 2025-09-02 DIAGNOSIS — G44.89 OTHER HEADACHE SYNDROME: Primary | ICD-10-CM

## 2025-09-02 PROCEDURE — 3079F DIAST BP 80-89 MM HG: CPT | Performed by: PHYSICIAN ASSISTANT

## 2025-09-02 PROCEDURE — 99213 OFFICE O/P EST LOW 20 MIN: CPT | Performed by: PHYSICIAN ASSISTANT

## 2025-09-02 PROCEDURE — 3077F SYST BP >= 140 MM HG: CPT | Performed by: PHYSICIAN ASSISTANT
